# Patient Record
Sex: FEMALE | Race: WHITE | NOT HISPANIC OR LATINO | URBAN - METROPOLITAN AREA
[De-identification: names, ages, dates, MRNs, and addresses within clinical notes are randomized per-mention and may not be internally consistent; named-entity substitution may affect disease eponyms.]

---

## 2018-06-27 ENCOUNTER — APPOINTMENT (OUTPATIENT)
Age: 63
Setting detail: DERMATOLOGY
End: 2018-06-27

## 2018-06-27 DIAGNOSIS — L70.8 OTHER ACNE: ICD-10-CM

## 2018-06-27 DIAGNOSIS — Z71.89 OTHER SPECIFIED COUNSELING: ICD-10-CM

## 2018-06-27 DIAGNOSIS — D18.0 HEMANGIOMA: ICD-10-CM

## 2018-06-27 DIAGNOSIS — D22 MELANOCYTIC NEVI: ICD-10-CM

## 2018-06-27 DIAGNOSIS — L71.8 OTHER ROSACEA: ICD-10-CM

## 2018-06-27 PROBLEM — D48.5 NEOPLASM OF UNCERTAIN BEHAVIOR OF SKIN: Status: ACTIVE | Noted: 2018-06-27

## 2018-06-27 PROBLEM — I10 ESSENTIAL (PRIMARY) HYPERTENSION: Status: ACTIVE | Noted: 2018-06-27

## 2018-06-27 PROBLEM — D22.62 MELANOCYTIC NEVI OF LEFT UPPER LIMB, INCLUDING SHOULDER: Status: ACTIVE | Noted: 2018-06-27

## 2018-06-27 PROBLEM — D18.01 HEMANGIOMA OF SKIN AND SUBCUTANEOUS TISSUE: Status: ACTIVE | Noted: 2018-06-27

## 2018-06-27 PROCEDURE — OTHER REASSURANCE: OTHER

## 2018-06-27 PROCEDURE — OTHER PRESCRIPTION: OTHER

## 2018-06-27 PROCEDURE — 99214 OFFICE O/P EST MOD 30 MIN: CPT | Mod: 25

## 2018-06-27 PROCEDURE — OTHER FOLLOW UP FOR NEXT VISIT: OTHER

## 2018-06-27 PROCEDURE — 11100: CPT

## 2018-06-27 PROCEDURE — OTHER TREATMENT REGIMEN: OTHER

## 2018-06-27 PROCEDURE — OTHER SUNSCREEN RECOMMENDATIONS: OTHER

## 2018-06-27 PROCEDURE — OTHER BIOPSY BY SHAVE METHOD: OTHER

## 2018-06-27 PROCEDURE — OTHER COUNSELING: OTHER

## 2018-06-27 RX ORDER — DOXYCYCLINE HYCLATE 100 MG/1
1 CAPSULE, GELATIN COATED ORAL
Qty: 30 | Refills: 2 | Status: ERX | COMMUNITY
Start: 2018-06-27

## 2018-06-27 RX ORDER — IVERMECTIN 10 MG/G
1 APPLICATION CREAM TOPICAL QD
Qty: 1 | Refills: 1 | Status: ERX | COMMUNITY
Start: 2018-06-27

## 2018-06-27 ASSESSMENT — LOCATION DETAILED DESCRIPTION DERM
LOCATION DETAILED: LEFT MID TEMPLE
LOCATION DETAILED: RIGHT MEDIAL UPPER BACK
LOCATION DETAILED: RIGHT INFERIOR CENTRAL MALAR CHEEK
LOCATION DETAILED: LEFT CENTRAL MALAR CHEEK
LOCATION DETAILED: LEFT MID-UPPER BACK
LOCATION DETAILED: LEFT ANTERIOR PROXIMAL UPPER ARM

## 2018-06-27 ASSESSMENT — LOCATION SIMPLE DESCRIPTION DERM
LOCATION SIMPLE: LEFT UPPER ARM
LOCATION SIMPLE: RIGHT UPPER BACK
LOCATION SIMPLE: LEFT CHEEK
LOCATION SIMPLE: LEFT UPPER BACK
LOCATION SIMPLE: LEFT TEMPLE
LOCATION SIMPLE: RIGHT CHEEK

## 2018-06-27 ASSESSMENT — LOCATION ZONE DERM
LOCATION ZONE: TRUNK
LOCATION ZONE: ARM
LOCATION ZONE: FACE

## 2018-06-27 NOTE — PROCEDURE: FOLLOW UP FOR NEXT VISIT
Scheduled For Follow Up In (Optional): 1 year
Instructions (Optional): Path
Instructions (Optional): Fbse
Detail Level: Simple
Instructions (Optional): Re evaluate tx regimen
Scheduled For Follow Up In (Optional): prn
Instructions (Optional): FBSE. Monitor for changes
Scheduled For Follow Up In (Optional): 6 weeks
Scheduled For Follow Up In (Optional): 2weeks

## 2018-06-27 NOTE — PROCEDURE: BIOPSY BY SHAVE METHOD
X Size Of Lesion In Cm: 0
Billing Type: Third-Party Bill
Dressing: no dressing applied
Biopsy Method: Dermablade
Hemostasis: Drysol
Electrodesiccation Text: The wound bed was treated with electrodesiccation after the biopsy was performed.
Bill For Surgical Tray: no
Wound Care: Bacitracin
Consent: Written consent was obtained and risks were reviewed including but not limited to scarring, infection, bleeding, scabbing, incomplete removal, nerve damage and allergy to anesthesia.
Anesthesia Type: 1% lidocaine with epinephrine
Depth Of Biopsy: dermis
Silver Nitrate Text: The wound bed was treated with silver nitrate after the biopsy was performed.
Notification Instructions: Patient will be notified of biopsy results. However, patient instructed to call the office if not contacted within 2 weeks.
Anesthesia Volume In Cc: 0.2
Cryotherapy Text: The wound bed was treated with cryotherapy after the biopsy was performed.
Post-Care Instructions: I reviewed with the patient in detail post-care instructions. Patient is to keep the biopsy site dry overnight, and then apply bacitracin twice daily until healed. Patient may apply hydrogen peroxide soaks to remove any crusting.
Biopsy Type: H and E
Was A Bandage Applied: Yes
Size Of Lesion In Cm: 0.3
Type Of Destruction Used: Curettage
Curettage Text: The wound bed was treated with curettage after the biopsy was performed.
Electrodesiccation And Curettage Text: The wound bed was treated with electrodesiccation and curettage after the biopsy was performed.
Detail Level: Detailed

## 2018-06-27 NOTE — PROCEDURE: TREATMENT REGIMEN
Continue Regimen: Meto cream in the morning
Samples Given: Soolantra use nighlty
Modify Regimen: Soolantra at night
Detail Level: Zone
Discontinue Regimen: Mupirocin ointment
Initiate Treatment: Increase sun protection. Pt has sun screen from our office. Start doxycycline 100 mg daily

## 2018-06-28 ENCOUNTER — RX ONLY (RX ONLY)
Age: 63
End: 2018-06-28

## 2018-06-28 RX ORDER — IVERMECTIN 10 MG/G
CREAM TOPICAL
Qty: 1 | Refills: 1 | Status: ERX | COMMUNITY
Start: 2018-06-28

## 2018-07-05 ENCOUNTER — RX ONLY (RX ONLY)
Age: 63
End: 2018-07-05

## 2018-07-05 RX ORDER — IVERMECTIN 10 MG/G
CREAM TOPICAL
Qty: 1 | Refills: 1 | Status: ERX

## 2018-07-30 ENCOUNTER — APPOINTMENT (OUTPATIENT)
Age: 63
Setting detail: DERMATOLOGY
End: 2018-07-31

## 2018-07-30 ENCOUNTER — APPOINTMENT (OUTPATIENT)
Age: 63
Setting detail: DERMATOLOGY
End: 2018-07-30

## 2018-07-30 DIAGNOSIS — L82.0 INFLAMED SEBORRHEIC KERATOSIS: ICD-10-CM

## 2018-07-30 PROBLEM — C44.01 BASAL CELL CARCINOMA OF SKIN OF LIP: Status: ACTIVE | Noted: 2018-07-30

## 2018-07-30 PROCEDURE — 77300 RADIATION THERAPY DOSE PLAN: CPT

## 2018-07-30 PROCEDURE — 77261 THER RADIOLOGY TX PLNG SMPL: CPT

## 2018-07-30 PROCEDURE — OTHER FOLLOW UP FOR NEXT VISIT: OTHER

## 2018-07-30 PROCEDURE — 77280 THER RAD SIMULAJ FIELD SMPL: CPT

## 2018-07-30 PROCEDURE — 99214 OFFICE O/P EST MOD 30 MIN: CPT | Mod: 25

## 2018-07-30 PROCEDURE — 17110 DESTRUCT B9 LESION 1-14: CPT

## 2018-07-30 PROCEDURE — OTHER LIQUID NITROGEN: OTHER

## 2018-07-30 PROCEDURE — OTHER TREATMENT REGIMEN: OTHER

## 2018-07-30 PROCEDURE — OTHER SUPERFICIAL RADIATION TREATMENT: OTHER

## 2018-07-30 PROCEDURE — 77334 RADIATION TREATMENT AID(S): CPT

## 2018-07-30 ASSESSMENT — LOCATION ZONE DERM: LOCATION ZONE: TRUNK

## 2018-07-30 ASSESSMENT — LOCATION DETAILED DESCRIPTION DERM: LOCATION DETAILED: RIGHT RIB CAGE

## 2018-07-30 ASSESSMENT — LOCATION SIMPLE DESCRIPTION DERM: LOCATION SIMPLE: ABDOMEN

## 2018-07-30 NOTE — PROCEDURE: TREATMENT REGIMEN
Detail Level: Zone
Plan: Per the request of Dr. Harrell, patient was seen today for Superficial Radiation Therapy requiring simulation (CPT® 88895) in preparation for treatment of specific diseased site(s). Simulation is necessary to determine correct patient and treatment portal positioning, deliver safe and effective radiation therapy. Today’s setup was evaluated determining continuation of treatment with the current plan, or necessary changes as appropriate. All appropriate custom blocking and treatment parameters verified by the Radiation Therapist\\nToday’s visit is for Simulation and planning for radiation therapy.  Question were answered and concerns were address.  Patient was evaluated based on listed criteria and is a suitable candidate to begin SRT.  \\n

## 2018-07-30 NOTE — PROCEDURE: SUPERFICIAL RADIATION TREATMENT
Port Dimensions-X Axis In Cm: 1.5
Functional Status: 1 (ambulatory, light activity)
Bill For Simulation And Treatment Device Design: Yes
Fractionation Number: 0
Pathology Override (Pathology Will Render As Diagnosis Name If Left Blank): Basal Cell Carcinoma Nodular and Micronodular Type
Energy (Optional-Please Include Units): 70kV
Total Number Of Fractions Rx 3: 15
Field Number: 1
Time Dose Fractionation (Optional- Include Units If Applicable): 22
Total Dose (Optional-Please Include Units) Rx 2: 3882.6cGy
Treatment Device Design After Initial Simulation Justification (Will Render If Bill For Treatment Devices = Yes): The patient is status post radiation simulation and is evaluated as to the use of additional devices for shielding and placement for radiation therapy.
Bill For Radiation Treatment: No
Shielding Size (Optional- Include Units): 1.5cm x 1.5cm
Energy Output In Cgy/Min (Optional): 257.46
Fractionation Number (Evaluation): 5
Treatment Time In Min (Optional): 0.61
Simple Simulation Afterword Text Will Be Included With Simple Simulations (Indications............): The patient had a complete consultation regarding all applicable modalities for the treatment of their skin cancer and based on a variety of factors including the type of tumor, size, and location, the relevant medical history as well as local tissue factors, the functional status of the individual, the ability to perform necessary postoperative wound instructions and the need for simultaneous treatments as well as overall wound healing status, it was determined that the patient would begin radiation therapy treatment for skin cancer.  A full simulation and treatment device design was performed including the determination and formulation of appropriate simple and complex devices including lead shield of 0.762 mm thickness to form molded customized shielding to specifically correlate with the lesion size including treatment margin.  The custom lead shield is adequate to accommodate the appropriate applicator and provide adequate shielding around the treatment site.  The specific field applicator, shields, and devices both simple and complex as well as the specific patient setup is outlined below.  The patient was given a full consent for superficial radiation to both verbally and in writing and the full determination of patient's eligibility for treatment and selection is outlined on the patient eligibility and treatment selection form.  The specific superficial radiotherapy prescription was determined and was documented on the superficial radiotherapy prescription form.  A treatment calculation was also performed and documented on the treatment calculation form.  Based on the prescription, the patient was scheduled for a series of fractional treatments.
Custom Shielding Preamble Text Will Not Be Included With Simple Simulations (.......... X X Y Cm): A lead shield of 0.762 mm thickness is utilized to form a molded, custom shield with a
Total Dose (Optional-Please Include Units): 1287.3cGy
Treatment Margins In Cm: 0.5
Simple Simulation Preamble Text Will Be Included With Simple Simulations (.......... Indications): Simple simulation was performed today for the following reasons:
Assessment: Appropriate reaction
Fractions / Week Rx 2: 3
Intro Statement (Will Not Render If Left Blank): The patient is undergoing superficial radiation therapy for skin cancer and presents for weekly evaluation and management.  Per protocol and as documented on the flow sheet, the patient was questioned as to subjective redness, pruritus, pain, drainage, fatigue, or any other symptoms.  Objectively, the radiation area was evaluated with regards to erythema, atrophy, scale, crusting, erosion, ulceration, edema, purpura, tenderness, warmth, drainage, and any other findings.  The plan was extensively reviewed including the dose, and dosing schedule.  The simulation and clinical setup was also reviewed as was the external and any internal shields and based on this review the appropriateness and sufficiency of treatment was determined.
Energy (Optional-Please Include Units) Rx 2: 50kV
Computed Treatment Time In Min (Will Render The Same As Calculated Treatment Time If Left Blank): 0.42
Daily Fractionated Dose (Optional- Include Units) Rx 2: 258.84cGy
Field Size (Applicator): 2.0 cm
Treatment Time / Fractionation (Optional- Include Units) Rx 2: 0.36min
Patient Positioning: Sitting
Treatment Time / Fractionation (Optional- Include Units): 0.42min
Body Location Override (Optional): R Upper cutaneous lip
Daily Fractionated Dose (Optional- Include Units): 257.46cGy
Time Dose Fractionation (Optional- Include Units If Applicable) Rx 2: 67
Detail Level: Zone
Custom Shielding Afterword Text Will Not Be Included With Simple Simulations (X X Y Cm............): port to correlate with the lesion size, including treatment margin. The custom lead shield is adequate to accommodate the appropriate applicator and provide adequate shielding around the treatment site. Additional shielding (as noted below) is used to protect sensitive, normal tissues.
Dose / Tx In Cgy (Optional): 381.86

## 2018-07-30 NOTE — PROCEDURE: LIQUID NITROGEN
Post-Care Instructions: I reviewed with the patient in detail post-care instructions. Patient is to wear sunprotection, and avoid picking at any of the treated lesions. Pt may apply Vaseline to crusted or scabbing areas.
Medical Necessity Information: It is in your best interest to select a reason for this procedure from the list below. All of these items fulfill various CMS LCD requirements except the new and changing color options.
Include Z78.9 (Other Specified Conditions Influencing Health Status) As An Associated Diagnosis?: No
Consent: The patient's consent was obtained including but not limited to risks of crusting, scabbing, blistering, scarring, darker or lighter pigmentary change, recurrence, incomplete removal and infection.
Medical Necessity Clause: This procedure was medically necessary because the lesions that were treated were:
Detail Level: Zone
Number Of Freeze-Thaw Cycles: 3 freeze-thaw cycles